# Patient Record
Sex: FEMALE | Race: WHITE
[De-identification: names, ages, dates, MRNs, and addresses within clinical notes are randomized per-mention and may not be internally consistent; named-entity substitution may affect disease eponyms.]

---

## 2017-02-18 ENCOUNTER — HOSPITAL ENCOUNTER (EMERGENCY)
Dept: HOSPITAL 74 - JERFT | Age: 27
Discharge: HOME | End: 2017-02-18
Payer: COMMERCIAL

## 2017-02-18 VITALS — BODY MASS INDEX: 23.3 KG/M2

## 2017-02-18 VITALS — HEART RATE: 88 BPM | SYSTOLIC BLOOD PRESSURE: 100 MMHG | TEMPERATURE: 97.8 F | DIASTOLIC BLOOD PRESSURE: 60 MMHG

## 2017-02-18 DIAGNOSIS — M54.5: Primary | ICD-10-CM

## 2017-02-18 NOTE — PDOC
History of Present Illness





- General


Chief Complaint: Back Pain


Stated Complaint: BACK PAIN


Time Seen by Provider: 02/18/17 18:22


History Source: Patient





- History of Present Illness


Occurred: reports: other


Severity: reports: mild


Pain Location: reports: back





Past History





- Past Medical History


Allergies/Adverse Reactions: 


 Allergies











Allergy/AdvReac Type Severity Reaction Status Date / Time


 


No Known Allergies Allergy   Verified 02/18/17 18:12











Home Medications: 


Ambulatory Orders





Ibuprofen [Motrin -] 800 mg PO Q6H #30 tablet 02/18/17 








Asthma: No


Cancer: No


Cardiac Disorders: No


Diabetes: No


HTN: No


Seizures: No


Thyroid Disease: No


Other medical history: NONE





- Psycho/Social/Smoking Cessation Hx


Anxiety: No


Suicidal Ideation: No


Smoking History: Never smoked


Have you smoked in the past 12 months: No


Information on smoking cessation initiated: No


Hx Alcohol Use: No


Drug/Substance Use Hx: No


Substance Use Type: None


Hx Substance Use Treatment: No





**Review of Systems





- Review of Systems


Constitutional: No: Chills, Fever


ABD/GI: No: Nausea, Vomiting, Abdominal cramping


: No: Burning, Dysuria


Musculoskeletal: Yes: Back Pain


Neurological: No: Numbness, Tingling, Weakness





*Physical Exam





- Vital Signs


 Last Vital Signs











Temp Pulse Resp BP Pulse Ox


 


 97.8 F   88   18   100/60   100 


 


 02/18/17 18:13  02/18/17 18:13  02/18/17 18:13  02/18/17 18:13  02/18/17 18:13














- Physical Exam


General Appearance: Yes: Appropriately Dressed.  No: Apparent Distress


HEENT: positive: Normal Voice


Neck: positive: Supple


Respiratory/Chest: negative: Respiratory Distress


Gastrointestinal/Abdominal: positive: Soft.  negative: Tender


Musculoskeletal: positive: Normal Inspection, Vertebral Tenderness (to lower 

back with forward bending).  negative: CVA Tenderness


Extremity: positive: Normal Inspection


Integumentary: positive: Dry, Warm


Neurologic: positive: Fully Oriented, Alert, Normal Mood/Affect





Medical Decision Making





- Medical Decision Making


02/18/17 18:55


26-year-old female, no significant history, here with back pain.  States 

approximately one month ago, she developed non-radiating lower back pain that 

is intermittent and worse with certain movements.  Unable to describe pain and 

states it is about a 4-5 out of 10.  Has not taken anything for pain.  Denies 

any trauma.  No lower extremity weakness, saddle anesthesia or bowel or bladder 

incontinence.  No  symptoms, nausea, vomiting, fever or chills.  No history 

of similar pain in the past.  Seeking medical evaluation for the first time 

today for unclear reasons as denies that pain has worsened.








See exam








Low back pain


No trauma


No infectious symptoms


No red flags in ED


Well yenni and in NAD  w/ reproducible tenderness on exam


M/l MSK


-d/c with pain control and pmd f/u








02/18/17 19:21








*DC/Admit/Observation/Transfer


Diagnosis at time of Disposition: 


Back pain


Qualifiers:


 Back pain location: low back pain Chronicity: acute Back pain laterality: 

bilateral Sciatica presence: without sciatica Qualified Code(s): M54.5 - Low 

back pain





- Discharge Dispostion


Disposition: HOME


Condition at time of disposition: Good





- Prescriptions


Prescriptions: 


Ibuprofen [Motrin -] 800 mg PO Q6H #30 tablet





- Patient Instructions


Printed Discharge Instructions:  Low Back Pain


Additional Instructions: 


Take Motrin as prescribed and follow-up with your PMD if pain persists

## 2018-01-26 ENCOUNTER — HOSPITAL ENCOUNTER (EMERGENCY)
Dept: HOSPITAL 74 - JERFT | Age: 28
Discharge: HOME | End: 2018-01-26
Payer: COMMERCIAL

## 2018-01-26 VITALS — DIASTOLIC BLOOD PRESSURE: 64 MMHG | TEMPERATURE: 98.1 F | SYSTOLIC BLOOD PRESSURE: 104 MMHG | HEART RATE: 74 BPM

## 2018-01-26 VITALS — BODY MASS INDEX: 21.1 KG/M2

## 2018-01-26 DIAGNOSIS — R07.89: Primary | ICD-10-CM

## 2018-01-26 DIAGNOSIS — M54.12: ICD-10-CM

## 2018-01-26 NOTE — PDOC
History of Present Illness





- General


Chief Complaint: Chest Pain


Stated Complaint: CHEST PAIN


Time Seen by Provider: 01/26/18 21:20


History Source: Patient


Exam Limitations: No Limitations





- History of Present Illness


Initial Comments: 





CHIEF COMPLAINT:  28 y/o afebrile female with no significant PMH c/o left sided 

chest pain with inspiration since waking up this morning. 





HISTORY OF PRESENT ILLNESS:  The patient states she also has pain in her left 

shoulder and left upper back.  She does admit this has happened before and 

usually occurs when she is very stressed.  She also admits that she lifts her 2 

children often.  She denies f/c, n/v/d, HA, jaw pain, dizziness, pain between 

her shoulder blades, cough, abd pain. 





Vital signs on arrival are within normal limits. 





REVIEW OF SYSTEMS:


GENERAL/CONSTITUTIONAL: No fever/chills. No weakness. No weight change.


HEAD, EYES, EARS, NOSE AND THROAT: No change in vision. No ear pain or 

discharge. No sore throat.


CARDIOVASCULAR: +chest pain.  No shortness of breath.


RESPIRATORY: No cough, wheezing, or hemoptysis.


GASTROINTESTINAL: No abd pain, nausea, vomiting, diarrhea. 


GENITOURINARY: No dysuria, frequency, or change in urination.


MUSCULOSKELETAL: No joint or muscle swelling or pain. No neck or back pain.


SKIN: No rash or easy bruising.


NEUROLOGIC: No headache, vertigo, loss of consciousness, or loss of sensation.








PHYSICAL EXAM:


GENERAL: The patient is awake, alert, and fully oriented, in no acute distress.

  She is well appearing and ambulatory. 


NECK:  No midline cervical spine TTP or step offs. 


LUNGS: Clear to auscultation bilaterally. Normal excursion. No respiratory 

distress or use of accessory muscles.


CV: RRR, S1/S2, no MRG. Cap refill < 2 sec.


CHEST WALL:  Reproducible pain with palpation of left anterior chest wall


EXTREMITIES: Normal range of motion, no edema.  Pain reproduced with palpation 

of left trapezius muscle and left cervical paravertebral muscles, causing 

tingling in left arm.  


NEUROLOGICAL: Normal speech, normal gait. CN II-XII grossly intact.  Equal  

strength b/l.  Sensory intact in left arm and hand. 


SKIN: Warm, dry, normal turgor, no rashes or lesions noted.

















Past History





- Past Medical History


Allergies/Adverse Reactions: 


 Allergies











Allergy/AdvReac Type Severity Reaction Status Date / Time


 


No Known Allergies Allergy   Verified 02/18/17 18:12











Home Medications: 


Ambulatory Orders





Ibuprofen [Motrin -] 800 mg PO Q6H #30 tablet 02/18/17 








Asthma: No


Cancer: No


Cardiac Disorders: No


Diabetes: No


HTN: No


Seizures: No


Thyroid Disease: No





- Suicide/Smoking/Psychosocial Hx


Smoking History: Never smoked


Have you smoked in the past 12 months: No


Hx Alcohol Use: No


Drug/Substance Use Hx: No


Substance Use Type: None


Hx Substance Use Treatment: No





*Physical Exam





- Vital Signs


 Last Vital Signs











Temp Pulse Resp BP Pulse Ox


 


 98.1 F   74   16   104/64   100 


 


 01/26/18 20:15  01/26/18 20:15  01/26/18 20:15  01/26/18 20:15  01/26/18 20:15














Medical Decision Making





- Medical Decision Making





A/P:  28 y/o female with musculoskeletal left neck pain, chest pain and 

shoulder pain.  Pt was given the option to give a urine sample to be given 

motrin or take motrin at home for her symptoms and she would like to go home.  

Suggested she apply heat and massage to affected area, take motrin every 6 

hours for pain and follow up with her primary doctor within 1 week.  The 

patient was instructed to return to the ER with any worsening or concerning 

symptoms. 





The patient verbalizes understanding of all instructions, has no further 

questions and is awaiting discharge.














*DC/Admit/Observation/Transfer


Diagnosis at time of Disposition: 


 Musculoskeletal chest pain, Musculoskeletal neck pain, Cervical radiculopathy








- Discharge Dispostion


Disposition: HOME


Condition at time of disposition: Good





- Referrals





- Patient Instructions


Printed Discharge Instructions:  DI for Atypical Chest Pain, DI for 

Musculoskeletal Pain, DI for Cervical Radiculopathy


Additional Instructions: 


Discharge Instructions:


-Take motrin for pain every 6 hours if needed


-Apply heat and massage to affected areas to help with pain


-Follow up with your doctor within 1 week


-return to the ER with any worsening or concerning symptoms. 





- Post Discharge Activity

## 2018-02-01 NOTE — EKG
Test Reason : 

Blood Pressure : ***/*** mmHG

Vent. Rate : 070 BPM     Atrial Rate : 070 BPM

   P-R Int : 152 ms          QRS Dur : 084 ms

    QT Int : 402 ms       P-R-T Axes : 044 075 052 degrees

   QTc Int : 434 ms

 

NORMAL SINUS RHYTHM

CANNOT RULE OUT ANTERIOR INFARCT , AGE UNDETERMINED

ABNORMAL ECG

NO PREVIOUS ECGS AVAILABLE

Confirmed by AVIVA PENA MD ([id number]) on 2/1/2018 11:01:50 AM

 

Referred By:             Confirmed By:AVIVA PENA MD

## 2018-12-13 ENCOUNTER — HOSPITAL ENCOUNTER (EMERGENCY)
Dept: HOSPITAL 74 - JERFT | Age: 28
Discharge: HOME | End: 2018-12-13
Payer: COMMERCIAL

## 2018-12-13 VITALS — TEMPERATURE: 100.1 F | HEART RATE: 104 BPM | DIASTOLIC BLOOD PRESSURE: 65 MMHG | SYSTOLIC BLOOD PRESSURE: 108 MMHG

## 2018-12-13 VITALS — BODY MASS INDEX: 20 KG/M2

## 2018-12-13 DIAGNOSIS — J02.0: Primary | ICD-10-CM

## 2018-12-13 NOTE — PDOC
Rapid Medical Evaluation


Time Seen by Provider: 12/13/18 18:56


Medical Evaluation: 


 Allergies











Allergy/AdvReac Type Severity Reaction Status Date / Time


 


No Known Allergies Allergy   Verified 12/13/18 18:56











12/13/18 18:56


I have performed a brief in-person evaluation of this patient





The patient presents with a  chief complaint of sore throat for 2 days. 

subjective fever, (+)h/o recurrent throat infections needing antibiotics, last 

one was 5yrs ago.





Pertinent physical exam findings: mild pharyngeal erythema, no tonsillar 

inflammation, no exudates





I have ordered the following: rapid strep, flu swab





The patient will proceed to the ED for further evaluation


12/13/18 21:23








**Discharge Disposition





- Diagnosis


 Strep throat








- Discharge Dispostion


Disposition: HOME


Condition at time of disposition: Stable





- Prescriptions


Prescriptions: 


Amoxicillin - [Amoxicillin 500mg Capsule -] 500 mg PO TID 10 Days #30 capsule





- Referrals


Referrals: 


Julius Dimas MD [Primary Care Provider] - 





- Patient Instructions


Printed Discharge Instructions:  DI for Strep Throat


Additional Instructions: 


Take antibiotic as directed.  After taking the antibiotic for 2 days throw out 

your toothbrush and replace with new.  For the fever alternate Tylenol 650 

Milligan grams every 4 hours and ibuprofen 6 mg every 6 hours.  Follow-up with 

your family physician.





- Post Discharge Activity


Work/School Note:  Back to Work

## 2018-12-13 NOTE — PDOC
History of Present Illness





- General


Chief Complaint: Sore Throat


Stated Complaint: THROAT PAIN


Time Seen by Provider: 12/13/18 18:56


History Source: Patient


Exam Limitations: No Limitations





- History of Present Illness


Initial Comments: 


Patient is a 28-year-old female who states over the past 3 days she has had a 

sore throat and a fever.  Patient admits to lymphadenopathy.  She denies sick 

contacts and she describes the pain as sore and rates it at a 7 out of 10.  No 

analgesics/antipyretics taken prior to arrival.  Denies any aggravating or 

relieving factors.


12/13/18 19:37








Past History





- Travel


Traveled outside of the country in the last 30 days: No


Close contact w/someone who was outside of country & ill: No





- Past Medical History


Allergies/Adverse Reactions: 


 Allergies











Allergy/AdvReac Type Severity Reaction Status Date / Time


 


No Known Allergies Allergy   Verified 12/13/18 18:56











Home Medications: 


Ambulatory Orders





Amoxicillin - [Amoxicillin 500mg Capsule -] 500 mg PO TID 10 Days #30 capsule 12 /13/18 








Asthma: No


Cancer: No


Cardiac Disorders: No


COPD: No


Diabetes: No


HTN: No


Seizures: No


Thyroid Disease: No





- Suicide/Smoking/Psychosocial Hx


Smoking History: Never smoked


Have you smoked in the past 12 months: No


Information on smoking cessation initiated: No


Hx Alcohol Use: No


Drug/Substance Use Hx: No


Substance Use Type: None


Hx Substance Use Treatment: No





**Review of Systems





- Review of Systems


Able to Perform ROS?: Yes


Constitutional: Yes: Fever.  No: Chills


HEENTM: Yes: Throat Pain.  No: Difficulty Swallowing


Respiratory: No: Cough


All Other Systems: Reviewed and Negative





*Physical Exam





- Vital Signs


 Last Vital Signs











Temp Pulse Resp BP Pulse Ox


 


 100.1 F H  104 H  16   108/65   99 


 


 12/13/18 18:56  12/13/18 18:56  12/13/18 18:56  12/13/18 18:56  12/13/18 18:56














- Physical Exam


Comments: 


Constitutional: VS stated, pt appears in no apparent distress; sitting in chair.


Skin: Warm and dry. Intact, no lesions or excoriations. 


Head: Normocephalic; atraumatic


Eyes:  conjunctiva pink without injection or discharge.


Ears: No tenderness present. Canals without injection or discharge; TM clear, 

no retractions or bulging. 


Nose: Patent, mucosa pink. No drainage.


Throat: Oropharynx with pink and moist mucosa. Dentition good. No pharyngeal 

edema; erythema or exudate. Tongue normal, no fasciculations. Airway Patent. 

Hypoglossal area is soft. Uvula is midline. No trismus. 


Neck: Supple, with full ROM, trachea midline, pt has right sided anterior 

cervical chain lymphadenopathy. No stridor or bruits. 


Lungs:  Bilateral breath sounds clear upon auscultation. No adventitious breath 

sounds.


Heart:  Regular rate and rhythm,  S1/S2 auscultated. No murmurs, rubs, or 

gallops. No visible pulsations, heaves, or lifts on precordium. 


Musculoskeletal: Moves all extremities without difficulty


Neuro: Alert and oriented


Psych: Appropriate affect


12/13/18 19:39








Moderate Sedation





- Procedure Monitoring


Vital Signs: 


Procedure Monitoring Vital Signs











Temperature  100.1 F H  12/13/18 18:56


 


Pulse Rate  104 H  12/13/18 18:56


 


Respiratory Rate  16   12/13/18 18:56


 


Blood Pressure  108/65   12/13/18 18:56


 


O2 Sat by Pulse Oximetry (%)  99   12/13/18 18:56











Medical Decision Making





- Medical Decision Making


Pt's RBS was positive


Pt was given first dose of abx here.


Pt was given Tylenol PO


12/13/18 19:39





12/13/18 19:59








*DC/Admit/Observation/Transfer


Diagnosis at time of Disposition: 


 Strep throat








- Discharge Dispostion


Disposition: HOME


Condition at time of disposition: Stable


Decision to Admit order: No





- Prescriptions


Prescriptions: 


Amoxicillin - [Amoxicillin 500mg Capsule -] 500 mg PO TID 10 Days #30 capsule





- Referrals


Referrals: 


Julius Dimas MD [Primary Care Provider] - 





- Patient Instructions


Printed Discharge Instructions:  DI for Strep Throat


Additional Instructions: 


Take antibiotic as directed.  After taking the antibiotic for 2 days throw out 

your toothbrush and replace with new.  For the fever alternate Tylenol 650 

Milligan grams every 4 hours and ibuprofen 6 mg every 6 hours.  Follow-up with 

your family physician.





- Post Discharge Activity


Forms/Work/School Notes:  Back to Work